# Patient Record
Sex: MALE | Race: ASIAN | NOT HISPANIC OR LATINO | Employment: FULL TIME | URBAN - METROPOLITAN AREA
[De-identification: names, ages, dates, MRNs, and addresses within clinical notes are randomized per-mention and may not be internally consistent; named-entity substitution may affect disease eponyms.]

---

## 2018-05-13 ENCOUNTER — OFFICE VISIT (OUTPATIENT)
Dept: URGENT CARE | Facility: CLINIC | Age: 63
End: 2018-05-13
Payer: COMMERCIAL

## 2018-05-13 VITALS
DIASTOLIC BLOOD PRESSURE: 80 MMHG | HEIGHT: 70 IN | OXYGEN SATURATION: 98 % | BODY MASS INDEX: 28.88 KG/M2 | WEIGHT: 201.72 LBS | SYSTOLIC BLOOD PRESSURE: 124 MMHG | TEMPERATURE: 97.2 F | RESPIRATION RATE: 16 BRPM | HEART RATE: 70 BPM

## 2018-05-13 DIAGNOSIS — R05.9 COUGH: ICD-10-CM

## 2018-05-13 DIAGNOSIS — K13.0 RASH ON LIPS: ICD-10-CM

## 2018-05-13 PROCEDURE — 99214 OFFICE O/P EST MOD 30 MIN: CPT | Performed by: PHYSICIAN ASSISTANT

## 2018-05-13 RX ORDER — METHYLPREDNISOLONE 4 MG/1
TABLET ORAL
Qty: 1 TAB | Refills: 0 | Status: SHIPPED | OUTPATIENT
Start: 2018-05-13 | End: 2021-07-14

## 2018-05-13 RX ORDER — PROMETHAZINE HYDROCHLORIDE AND CODEINE PHOSPHATE 6.25; 1 MG/5ML; MG/5ML
5-10 SYRUP ORAL 3 TIMES DAILY PRN
Qty: 150 ML | Refills: 0 | Status: SHIPPED | OUTPATIENT
Start: 2018-05-13 | End: 2018-05-18

## 2018-05-13 RX ORDER — CEFUROXIME AXETIL 500 MG/1
500 TABLET ORAL 2 TIMES DAILY
Qty: 14 TAB | Refills: 0 | Status: SHIPPED | OUTPATIENT
Start: 2018-05-13 | End: 2018-05-20

## 2018-05-13 ASSESSMENT — ENCOUNTER SYMPTOMS
MUSCULOSKELETAL NEGATIVE: 1
RESPIRATORY NEGATIVE: 1
CONSTITUTIONAL NEGATIVE: 1
EYES NEGATIVE: 1

## 2018-05-13 NOTE — PROGRESS NOTES
"Subjective:      Jonny Spring is a 63 y.o. male who presents with Rash (around mouth)            Rash   This is a new problem. The current episode started in the past 7 days (around mouth). The problem is unchanged. The affected locations include the face and lips. The rash is characterized by redness. It is unknown if there was an exposure to a precipitant. Pertinent negatives include no facial edema. Past treatments include nothing. The treatment provided no relief. There is no history of allergies.       Review of Systems   Constitutional: Negative.    HENT: Negative.    Eyes: Negative.    Respiratory: Negative.    Musculoskeletal: Negative.    Skin: Positive for rash.          Objective:     /80   Pulse 70   Temp 36.2 °C (97.2 °F)   Resp 16   Ht 1.778 m (5' 10\")   Wt 91.5 kg (201 lb 11.5 oz)   SpO2 98%   BMI 28.94 kg/m²      Physical Exam   Constitutional: He is oriented to person, place, and time. He appears well-developed and well-nourished. No distress.   HENT:   Head: Normocephalic and atraumatic.   Mouth/Throat: Oropharynx is clear and moist.   Eyes: Conjunctivae and EOM are normal. Pupils are equal, round, and reactive to light.   Neck: Normal range of motion. Neck supple.   Lymphadenopathy:     He has no cervical adenopathy.   Neurological: He is alert and oriented to person, place, and time.   Skin: Skin is warm and dry.   Psychiatric: He has a normal mood and affect. His behavior is normal.   Nursing note and vitals reviewed.    Active Ambulatory Problems     Diagnosis Date Noted   • Dental caries 10/16/2012     Resolved Ambulatory Problems     Diagnosis Date Noted   • No Resolved Ambulatory Problems     No Additional Past Medical History     Current Outpatient Prescriptions on File Prior to Visit   Medication Sig Dispense Refill   • fluticasone (FLONASE) 50 MCG/ACT nasal spray Spray 2 Sprays in nose every day. Each Nostril 1 Bottle 6   • azithromycin (ZITHROMAX) 250 MG TABS Take  by " mouth every day. 2 tabs by mouth day 1, 1 tab by mouth days 2-5 6 Each 0   • albuterol (VENTOLIN OR PROVENTIL) 108 (90 BASE) MCG/ACT AERS Inhale 2 Puffs by mouth every 6 hours as needed for Shortness of Breath. 1 Inhaler 1   • Pseudoephedrine-APAP-DM (DAYQUIL PO) Take  by mouth.       No current facility-administered medications on file prior to visit.      Social History     Social History   • Marital status:      Spouse name: N/A   • Number of children: N/A   • Years of education: N/A     Occupational History   • Not on file.     Social History Main Topics   • Smoking status: Never Smoker   • Smokeless tobacco: Never Used   • Alcohol use Yes      Comment: occasional   • Drug use: No   • Sexual activity: Not on file     Other Topics Concern   • Not on file     Social History Narrative   • No narrative on file     .History reviewed. No pertinent family history./  Social History     Social History   • Marital status:      Spouse name: N/A   • Number of children: N/A   • Years of education: N/A     Occupational History   • Not on file.     Social History Main Topics   • Smoking status: Never Smoker   • Smokeless tobacco: Never Used   • Alcohol use Yes      Comment: occasional   • Drug use: No   • Sexual activity: Not on file     Other Topics Concern   • Not on file     Social History Narrative   • No narrative on file     Patient has no known allergies.              Assessment/Plan:     · Lip.mouth rash      · rx meds;

## 2019-07-23 ENCOUNTER — APPOINTMENT (OUTPATIENT)
Dept: MEDICAL GROUP | Facility: MEDICAL CENTER | Age: 64
End: 2019-07-23
Payer: COMMERCIAL

## 2021-07-14 ENCOUNTER — OFFICE VISIT (OUTPATIENT)
Dept: URGENT CARE | Facility: CLINIC | Age: 66
End: 2021-07-14
Payer: COMMERCIAL

## 2021-07-14 VITALS
BODY MASS INDEX: 29.33 KG/M2 | RESPIRATION RATE: 16 BRPM | DIASTOLIC BLOOD PRESSURE: 80 MMHG | HEART RATE: 62 BPM | SYSTOLIC BLOOD PRESSURE: 110 MMHG | HEIGHT: 69 IN | WEIGHT: 198 LBS | OXYGEN SATURATION: 100 % | TEMPERATURE: 97 F

## 2021-07-14 DIAGNOSIS — I87.8 VENOUS STASIS: ICD-10-CM

## 2021-07-14 DIAGNOSIS — E11.319 DIABETIC RETINOPATHY OF LEFT EYE ASSOCIATED WITH TYPE 2 DIABETES MELLITUS, MACULAR EDEMA PRESENCE UNSPECIFIED, UNSPECIFIED RETINOPATHY SEVERITY (HCC): ICD-10-CM

## 2021-07-14 PROCEDURE — 99203 OFFICE O/P NEW LOW 30 MIN: CPT | Performed by: NURSE PRACTITIONER

## 2021-07-14 ASSESSMENT — ENCOUNTER SYMPTOMS
COUGH: 0
SHORTNESS OF BREATH: 0
PALPITATIONS: 0
PHOTOPHOBIA: 0
PND: 0
BLURRED VISION: 1
NAUSEA: 0
DIZZINESS: 0
VOMITING: 0
WHEEZING: 0
ORTHOPNEA: 0
FEVER: 0
CLAUDICATION: 0
CHILLS: 0
EYE PAIN: 0
DOUBLE VISION: 0
EYE DISCHARGE: 0
HEADACHES: 0
EYE REDNESS: 0

## 2021-07-14 NOTE — PROGRESS NOTES
Subjective:   Jonny Spring is a 66 y.o. male who presents for Blurred Vision (left eye, x3-4wks seen by eye doctor and referred to speacialist, not able to see specialist) and Leg Swelling (not swollen but having black spots on left leg, x1yr. ago)      HPI  66-year-old male patient in urgent care for blurred vision in his left eye for the past 3 to 4 weeks.  Was seen by [facility] optometrist who diagnosed him with diabetic retinopathy.  Provided patient with information for retinal specialist and patient has tried calling and even gone to the office and they state that he is unable to be seen for the next month.  Denies history of diabetes however patient records indicate that he was being worked up in 2012 for diabetes type 2.  He has never taken any medication for it.  Also is noticed some discoloration of his bilateral lower extremities.  Denies shortness of breath, wheezing, difficulty breathing, chest pain, radiating pain, numbness or tingling. Denies any leg swelling consistently.    Review of Systems   Constitutional: Negative for chills, fever and malaise/fatigue.   Eyes: Positive for blurred vision. Negative for double vision, photophobia, pain, discharge and redness.   Respiratory: Negative for cough, shortness of breath and wheezing.    Cardiovascular: Negative for chest pain, palpitations, orthopnea, claudication, leg swelling and PND.   Gastrointestinal: Negative for nausea and vomiting.   Neurological: Negative for dizziness and headaches.   All other systems reviewed and are negative.      Patient Active Problem List   Diagnosis   • Dental caries     History reviewed. No pertinent surgical history.  Social History     Tobacco Use   • Smoking status: Never Smoker   • Smokeless tobacco: Never Used   Substance Use Topics   • Alcohol use: Yes     Comment: occasional   • Drug use: No      History reviewed. No pertinent family history.   (Allergies, Medications, & Tobacco/Substance Use were reconciled by  "the Medical Assistant and reviewed by myself. The family history is prepopulated)     Objective:     /80 (BP Location: Left arm, Patient Position: Sitting, BP Cuff Size: Adult)   Pulse 62   Temp 36.1 °C (97 °F) (Temporal)   Resp 16   Ht 1.753 m (5' 9\")   Wt 89.8 kg (198 lb)   SpO2 100%   BMI 29.24 kg/m²     Physical Exam  Vitals reviewed.   Constitutional:       Appearance: Normal appearance.   Eyes:      Extraocular Movements: Extraocular movements intact.      Conjunctiva/sclera: Conjunctivae normal.      Funduscopic exam:        Left eye: Hemorrhage present.   Cardiovascular:      Rate and Rhythm: Normal rate and regular rhythm.      Pulses: Normal pulses.           Dorsalis pedis pulses are 2+ on the right side and 2+ on the left side.        Posterior tibial pulses are 2+ on the right side and 2+ on the left side.      Heart sounds: Normal heart sounds, S1 normal and S2 normal.      Comments: Blue/black discoloration of BLE  Pulmonary:      Effort: Pulmonary effort is normal.      Breath sounds: Normal breath sounds.   Abdominal:      General: Bowel sounds are normal. There is no distension.      Palpations: Abdomen is soft.      Tenderness: There is no abdominal tenderness.   Musculoskeletal:      Right lower leg: No edema.      Left lower leg: No edema.   Skin:     General: Skin is warm.   Neurological:      Mental Status: He is alert and oriented to person, place, and time.   Psychiatric:         Mood and Affect: Mood normal.         Behavior: Behavior normal.         Thought Content: Thought content normal.         Judgment: Judgment normal.         Assessment/Plan:     1. Diabetic retinopathy of left eye associated with type 2 diabetes mellitus, macular edema presence unspecified, unspecified retinopathy severity (HCC)  AMB REFERRAL TO ESTABLISH WITH [facility]   2. Venous stasis       Discussed physical examination findings as well as patient presentation, length patient symptoms is " consistent with diabetic retinopathy more than likely related to type 2 diabetes melitis.  Patient also appears to have some venous stasis of both lower extremities.  Advised patient to wear AMADOU hose and will provide patient with a referral to primary care for further work-up and evaluation.  Advised patient to continue to try to make an appointment with the retinal specialist for further work-up and evaluation of vision loss.    Patient expressed understanding agrees to plan is no further questions at this time.    Reached out to practice manager for appointment for patient. They will be in contact with him to schedule something soon.   LM for patient regarding this     Differential diagnosis, natural history, supportive care, and indications for immediate follow-up discussed.    Advised the patient to follow-up with the primary care physician for recheck, reevaluation, and consideration of further management.    Please note that this dictation was created using voice recognition software. I have made a reasonable attempt to correct obvious errors, but I expect that there are errors of grammar and possibly content that I did not discover before finalizing the note.    This note was electronically signed HARJIT Wolfe

## 2021-07-16 ENCOUNTER — TELEPHONE (OUTPATIENT)
Dept: SCHEDULING | Facility: IMAGING CENTER | Age: 66
End: 2021-07-16

## 2021-07-19 ENCOUNTER — OFFICE VISIT (OUTPATIENT)
Dept: MEDICAL GROUP | Facility: LAB | Age: 66
End: 2021-07-19
Payer: COMMERCIAL

## 2021-07-19 VITALS
SYSTOLIC BLOOD PRESSURE: 134 MMHG | WEIGHT: 199.2 LBS | OXYGEN SATURATION: 100 % | TEMPERATURE: 97.3 F | BODY MASS INDEX: 29.51 KG/M2 | RESPIRATION RATE: 16 BRPM | HEIGHT: 69 IN | DIASTOLIC BLOOD PRESSURE: 92 MMHG | HEART RATE: 67 BPM

## 2021-07-19 DIAGNOSIS — E53.8 VITAMIN B12 DEFICIENCY: ICD-10-CM

## 2021-07-19 DIAGNOSIS — Z00.00 PREVENTATIVE HEALTH CARE: ICD-10-CM

## 2021-07-19 DIAGNOSIS — I87.2 VENOUS STASIS DERMATITIS OF LEFT LOWER EXTREMITY: ICD-10-CM

## 2021-07-19 DIAGNOSIS — E55.9 VITAMIN D DEFICIENCY: ICD-10-CM

## 2021-07-19 DIAGNOSIS — E11.319 TYPE 2 DIABETES MELLITUS WITH RETINOPATHY OF LEFT EYE, WITHOUT LONG-TERM CURRENT USE OF INSULIN, MACULAR EDEMA PRESENCE UNSPECIFIED, UNSPECIFIED RETINOPATHY SEVERITY (HCC): Primary | ICD-10-CM

## 2021-07-19 DIAGNOSIS — Z23 NEED FOR VACCINATION: ICD-10-CM

## 2021-07-19 PROBLEM — E11.9 TYPE 2 DIABETES MELLITUS (HCC): Status: ACTIVE | Noted: 2021-07-19

## 2021-07-19 LAB
GLUCOSE BLD-MCNC: 254 MG/DL (ref 70–100)
HBA1C MFR BLD: 9.6 % (ref 0–5.6)
INT CON NEG: NEGATIVE
INT CON POS: POSITIVE

## 2021-07-19 PROCEDURE — 82962 GLUCOSE BLOOD TEST: CPT | Performed by: PHYSICIAN ASSISTANT

## 2021-07-19 PROCEDURE — 90732 PPSV23 VACC 2 YRS+ SUBQ/IM: CPT | Performed by: PHYSICIAN ASSISTANT

## 2021-07-19 PROCEDURE — 90471 IMMUNIZATION ADMIN: CPT | Performed by: PHYSICIAN ASSISTANT

## 2021-07-19 PROCEDURE — 83036 HEMOGLOBIN GLYCOSYLATED A1C: CPT | Performed by: PHYSICIAN ASSISTANT

## 2021-07-19 PROCEDURE — 99214 OFFICE O/P EST MOD 30 MIN: CPT | Mod: 25 | Performed by: PHYSICIAN ASSISTANT

## 2021-07-19 ASSESSMENT — VISUAL ACUITY
OD_CC: 20/25
OS_CC: 0

## 2021-07-19 ASSESSMENT — PATIENT HEALTH QUESTIONNAIRE - PHQ9: CLINICAL INTERPRETATION OF PHQ2 SCORE: 0

## 2021-07-19 NOTE — PROGRESS NOTES
"Subjective:   Jonny Spring is a 66 y.o. male here today to establish care with new provider, diabetic management    Type 2 diabetes mellitus (HCC)  New to me, chronic condition  Appears that he was initially diagnosed in 2012  Reports that he has never been on medication for diabetes, was able to control his levels with diet modifications and exercise  However, he has been noticing almost complete vision loss in the left eye for the past month as well as darkened skin changes on the left leg.  He was recently seen by [facility] optometry and was referred to a diabetic retinal specialist for suspected diabetic retinopathy of the left eye.  Reports that recently his diet has been fairly poor though he is able to make changes pretty easily.    Venous stasis dermatitis of left lower extremity  New to me, chronic  Noticing darkening skin changes of the left leg for the past couple months  Does have varicose veins that are not painful per patient  States that he sits almost all day at work  Does not currently wear compression stockings       Current medicines (including changes today)  Current Outpatient Medications   Medication Sig Dispense Refill   • metFORMIN (GLUCOPHAGE) 500 MG Tab Take 1 tablet by mouth 2 times a day with meals for 14 days. 28 tablet 0   • metformin (GLUCOPHAGE) 1000 MG tablet Take 1 tablet by mouth 2 times a day with meals. START AFTER 2 WEEKS OF 500mg TABS 180 tablet 1     No current facility-administered medications for this visit.     He  has no past medical history on file.    ROS   No fevers chills  No weight changes  Darkening of the skin of the left leg  Vision changes of the left eye  No chest pain, no shortness of breath, no abdominal pain       Objective:     /92 (BP Location: Left arm, Patient Position: Sitting, BP Cuff Size: Adult)   Pulse 67   Temp 36.3 °C (97.3 °F) (Temporal)   Resp 16   Ht 1.753 m (5' 9\")   Wt 90.4 kg (199 lb 3.2 oz)   SpO2 100%  Body mass index is 29.42 " kg/m².   Physical Exam:  Constitutional: Alert, no distress.  Skin: Warm, dry, good turgor, large varicose veins noted on bilateral lower extremity.  Dark hyperpigmentation noted on the left lateral lower leg.  Eye: Equal, round and reactive, conjunctiva clear, lids normal.  Neck: Trachea midline, no masses, no thyromegaly. No cervical or supraclavicular lymphadenopathy  Respiratory: Unlabored respiratory effort, lungs clear to auscultation, no wheezes, no ronchi.  Cardiovascular: Normal S1, S2, no murmur, no edema.  Psych: Alert and oriented x3, normal affect and mood.        Assessment and Plan:   The following treatment plan was discussed    1. Type 2 diabetes mellitus with retinopathy of left eye, without long-term current use of insulin, macular edema presence unspecified, unspecified retinopathy severity (HCC)  New to me, chronic condition  A1c is 9.6 today in clinic  Regarding vision changes -we have contacted the retinal specialist and will send over these notes; the retinal specialist will contact the patient to evaluate him ASAP.  Patient agreeable to starting Metformin though declined any further medication particularly with GLP-1.  We will start Metformin 500 mg 3 times daily x2 weeks and then increase to 1000 mg.  We advise to reduce sugar/carbohydrate/alcohol, eat more vegetables and lean meats such as fish/chicken/turkey. We also recommend 30 minutes of cardiovascular exercise most days of the week as tolerated.  We also discussed the importance of statin therapy especially with significantly elevated blood sugars today, however patient declines starting a statin.  Follow-up in 3 months for repeat blood work  - metFORMIN (GLUCOPHAGE) 500 MG Tab; Take 1 tablet by mouth 2 times a day with meals for 14 days.  Dispense: 28 tablet; Refill: 0  - metformin (GLUCOPHAGE) 1000 MG tablet; Take 1 tablet by mouth 2 times a day with meals. START AFTER 2 WEEKS OF 500mg TABS  Dispense: 180 tablet; Refill: 1  - POCT  Hemoglobin A1C  - POCT Glucose - 254 today in clinic  - MICROALBUMIN CREAT RATIO URINE; Future    2. Preventative health care  - CBC WITH DIFFERENTIAL; Future  - Comp Metabolic Panel; Future  - Lipid Profile; Future  - HEMOGLOBIN A1C; Future  - TSH WITH REFLEX TO FT4; Future  - HEP C VIRUS ANTIBODY; Future    3. Need for vaccination  - Pneumococal Polysaccharide Vaccine 23-Valent =>3YO SQ/IM    4. Vitamin B12 deficiency  - VITAMIN B12; Future    5. Vitamin D deficiency  - VITAMIN D,25 HYDROXY; Future    6. Venous stasis dermatitis of left lower extremity  New to me, chronic condition; asymptomatic venous stasis of left lower extremity  Recommend wearing compression stockings especially if he sits for prolonged periods of time at work.  Continue to monitor symptoms.  We will recheck this in 3 months at follow-up    *Patient declined colonoscopy and Shingrx today     New to me; has been seen by [facility] w/in the past 3 years.   Followup: Return in about 3 months (around 10/19/2021).       Luz Gong P.A.-C.  Supervising MD: Dr. Zafar Mendes MD  07/19/21

## 2021-07-19 NOTE — LETTER
Date: 21    Recipient:  [facility]      Recipient Fax Number: [phone]          Message: Medical Records for UMER Spring  [birth year]    Please call to schedule patient. Thanks!                    Confidentiality / Privacy Note: If you are not the intended recipient of this document, this document should be returned to [facility] immediately. Please contact the sender at [facility] so that we can arrange for the return of this transmission to us at no cost to you. The document accompanying this transmission contains information from [facility], which is confidential, proprietary or copyrighted and is intended solely for the use of the individual or entity named on this transaction. If you are not the intended recipient, you are notified that disclosing, copying, distributing or taking any action in reliance on the contents of this information is strictly prohibited. This prohibition includes, without limitations, displaying this transmission or any portion thereof, on any public bulletin board.            [address] [place] [id number] - [phone] - [facility]

## 2021-07-19 NOTE — ASSESSMENT & PLAN NOTE
New to me, chronic condition  Appears that he was initially diagnosed in 2012  Reports that he has never been on medication for diabetes, was able to control his levels with diet modifications and exercise  However, he has been noticing almost complete vision loss in the left eye for the past month as well as darkened skin changes on the left leg.  He was recently seen by [facility] optometry and was referred to a diabetic retinal specialist for suspected diabetic retinopathy of the left eye.  Reports that recently his diet has been fairly poor though he is able to make changes pretty easily.

## 2021-07-19 NOTE — LETTER
[facility]  Luz Gong P.A.-C.  [address]  [place] [address]  Fax: [phone]   Authorization for Release/Disclosure of   Protected Health Information   Name: NEFTALY PORTER : 1955 SSN: [id number]   Address: [address] [place] [address] [place] [address] Phone:    [phone] (home)    I authorize the entity listed below to release/disclose the PHI below to:   [facility]/Luz Gong P.A.-C. and Luz Gong P.A.-C.   Provider or Entity Name:  [facility]   Address   City, State, Zip   Phone:      Fax:     Reason for request: continuity of care   Information to be released:    [  ] LAST COLONOSCOPY,  including any PATH REPORT and follow-up  [  ] LAST FIT/COLOGUARD RESULT [  ] LAST DEXA  [  ] LAST MAMMOGRAM  [  ] LAST PAP  [  ] LAST LABS [  ] RETINA EXAM REPORT  [  ] IMMUNIZATION RECORDS  [ xxx  ] Release all info      [  ] Check here and initial the line next to each item to release ALL health information INCLUDING  _____ Care and treatment for drug and / or alcohol abuse  _____ HIV testing, infection status, or AIDS  _____ Genetic Testing    DATES OF SERVICE OR TIME PERIOD TO BE DISCLOSED: _____________  I understand and acknowledge that:  * This Authorization may be revoked at any time by you in writing, except if your health information has already been used or disclosed.  * Your health information that will be used or disclosed as a result of you signing this authorization could be re-disclosed by the recipient. If this occurs, your re-disclosed health information may no longer be protected by State or Federal laws.  * You may refuse to sign this Authorization. Your refusal will not affect your ability to obtain treatment.  * This Authorization becomes effective upon signing and will  on (date) __________.      If no date is indicated, this Authorization will  one (1) year from the signature date.    Name: Neftaly  Saw    Signature:   Date:     7/19/2021       PLEASE FAX REQUESTED RECORDS BACK TO: [phone]

## 2021-07-20 ENCOUNTER — HOSPITAL ENCOUNTER (OUTPATIENT)
Dept: LAB | Facility: MEDICAL CENTER | Age: 66
End: 2021-07-20
Attending: PHYSICIAN ASSISTANT
Payer: COMMERCIAL

## 2021-07-20 DIAGNOSIS — Z00.00 PREVENTATIVE HEALTH CARE: ICD-10-CM

## 2021-07-20 DIAGNOSIS — E53.8 VITAMIN B12 DEFICIENCY: ICD-10-CM

## 2021-07-20 DIAGNOSIS — E11.319 TYPE 2 DIABETES MELLITUS WITH RETINOPATHY OF LEFT EYE, WITHOUT LONG-TERM CURRENT USE OF INSULIN, MACULAR EDEMA PRESENCE UNSPECIFIED, UNSPECIFIED RETINOPATHY SEVERITY (HCC): ICD-10-CM

## 2021-07-20 DIAGNOSIS — E55.9 VITAMIN D DEFICIENCY: ICD-10-CM

## 2021-07-20 LAB
BASOPHILS # BLD AUTO: 0.8 % (ref 0–1.8)
BASOPHILS # BLD: 0.04 K/UL (ref 0–0.12)
EOSINOPHIL # BLD AUTO: 0.24 K/UL (ref 0–0.51)
EOSINOPHIL NFR BLD: 4.6 % (ref 0–6.9)
ERYTHROCYTE [DISTWIDTH] IN BLOOD BY AUTOMATED COUNT: 37 FL (ref 35.9–50)
HCT VFR BLD AUTO: 47.5 % (ref 42–52)
HGB BLD-MCNC: 16.2 G/DL (ref 14–18)
IMM GRANULOCYTES # BLD AUTO: 0.01 K/UL (ref 0–0.11)
IMM GRANULOCYTES NFR BLD AUTO: 0.2 % (ref 0–0.9)
LYMPHOCYTES # BLD AUTO: 2.4 K/UL (ref 1–4.8)
LYMPHOCYTES NFR BLD: 45.5 % (ref 22–41)
MCH RBC QN AUTO: 30.8 PG (ref 27–33)
MCHC RBC AUTO-ENTMCNC: 34.1 G/DL (ref 33.7–35.3)
MCV RBC AUTO: 90.3 FL (ref 81.4–97.8)
MONOCYTES # BLD AUTO: 0.42 K/UL (ref 0–0.85)
MONOCYTES NFR BLD AUTO: 8 % (ref 0–13.4)
NEUTROPHILS # BLD AUTO: 2.16 K/UL (ref 1.82–7.42)
NEUTROPHILS NFR BLD: 40.9 % (ref 44–72)
NRBC # BLD AUTO: 0 K/UL
NRBC BLD-RTO: 0 /100 WBC
PLATELET # BLD AUTO: 250 K/UL (ref 164–446)
PMV BLD AUTO: 10.4 FL (ref 9–12.9)
RBC # BLD AUTO: 5.26 M/UL (ref 4.7–6.1)
WBC # BLD AUTO: 5.3 K/UL (ref 4.8–10.8)

## 2021-07-20 PROCEDURE — 84443 ASSAY THYROID STIM HORMONE: CPT

## 2021-07-20 PROCEDURE — 82570 ASSAY OF URINE CREATININE: CPT

## 2021-07-20 PROCEDURE — 82043 UR ALBUMIN QUANTITATIVE: CPT

## 2021-07-20 PROCEDURE — 80053 COMPREHEN METABOLIC PANEL: CPT

## 2021-07-20 PROCEDURE — 86803 HEPATITIS C AB TEST: CPT

## 2021-07-20 PROCEDURE — 85025 COMPLETE CBC W/AUTO DIFF WBC: CPT

## 2021-07-20 PROCEDURE — 80061 LIPID PANEL: CPT

## 2021-07-20 PROCEDURE — 36415 COLL VENOUS BLD VENIPUNCTURE: CPT

## 2021-07-20 PROCEDURE — 82607 VITAMIN B-12: CPT

## 2021-07-20 PROCEDURE — 82306 VITAMIN D 25 HYDROXY: CPT

## 2021-07-21 LAB
25(OH)D3 SERPL-MCNC: 48 NG/ML (ref 30–100)
ALBUMIN SERPL BCP-MCNC: 4.2 G/DL (ref 3.2–4.9)
ALBUMIN/GLOB SERPL: 1.9 G/DL
ALP SERPL-CCNC: 57 U/L (ref 30–99)
ALT SERPL-CCNC: 17 U/L (ref 2–50)
ANION GAP SERPL CALC-SCNC: 11 MMOL/L (ref 7–16)
AST SERPL-CCNC: 23 U/L (ref 12–45)
BILIRUB SERPL-MCNC: 0.7 MG/DL (ref 0.1–1.5)
BUN SERPL-MCNC: 8 MG/DL (ref 8–22)
CALCIUM SERPL-MCNC: 8.8 MG/DL (ref 8.5–10.5)
CHLORIDE SERPL-SCNC: 101 MMOL/L (ref 96–112)
CHOLEST SERPL-MCNC: 196 MG/DL (ref 100–199)
CO2 SERPL-SCNC: 25 MMOL/L (ref 20–33)
CREAT SERPL-MCNC: 0.87 MG/DL (ref 0.5–1.4)
CREAT UR-MCNC: 125.69 MG/DL
FASTING STATUS PATIENT QL REPORTED: NORMAL
GLOBULIN SER CALC-MCNC: 2.2 G/DL (ref 1.9–3.5)
GLUCOSE SERPL-MCNC: 180 MG/DL (ref 65–99)
HCV AB SER QL: NORMAL
HDLC SERPL-MCNC: 45 MG/DL
LDLC SERPL CALC-MCNC: 125 MG/DL
MICROALBUMIN UR-MCNC: <1.2 MG/DL
MICROALBUMIN/CREAT UR: NORMAL MG/G (ref 0–30)
POTASSIUM SERPL-SCNC: 4.3 MMOL/L (ref 3.6–5.5)
PROT SERPL-MCNC: 6.4 G/DL (ref 6–8.2)
SODIUM SERPL-SCNC: 137 MMOL/L (ref 135–145)
TRIGL SERPL-MCNC: 129 MG/DL (ref 0–149)
TSH SERPL DL<=0.005 MIU/L-ACNC: 1.97 UIU/ML (ref 0.38–5.33)
VIT B12 SERPL-MCNC: 1545 PG/ML (ref 211–911)

## 2021-10-18 ENCOUNTER — HOSPITAL ENCOUNTER (OUTPATIENT)
Dept: LAB | Facility: MEDICAL CENTER | Age: 66
End: 2021-10-18
Attending: PHYSICIAN ASSISTANT
Payer: COMMERCIAL

## 2021-10-18 ENCOUNTER — OFFICE VISIT (OUTPATIENT)
Dept: MEDICAL GROUP | Facility: LAB | Age: 66
End: 2021-10-18
Payer: COMMERCIAL

## 2021-10-18 VITALS
SYSTOLIC BLOOD PRESSURE: 132 MMHG | HEIGHT: 69 IN | DIASTOLIC BLOOD PRESSURE: 82 MMHG | WEIGHT: 198.7 LBS | OXYGEN SATURATION: 100 % | RESPIRATION RATE: 18 BRPM | HEART RATE: 78 BPM | BODY MASS INDEX: 29.43 KG/M2 | TEMPERATURE: 97 F

## 2021-10-18 DIAGNOSIS — I87.2 VENOUS STASIS DERMATITIS OF LEFT LOWER EXTREMITY: ICD-10-CM

## 2021-10-18 DIAGNOSIS — E11.319 TYPE 2 DIABETES MELLITUS WITH RETINOPATHY OF LEFT EYE, WITHOUT LONG-TERM CURRENT USE OF INSULIN, MACULAR EDEMA PRESENCE UNSPECIFIED, UNSPECIFIED RETINOPATHY SEVERITY (HCC): ICD-10-CM

## 2021-10-18 DIAGNOSIS — Z12.5 SCREENING PSA (PROSTATE SPECIFIC ANTIGEN): ICD-10-CM

## 2021-10-18 LAB
ALBUMIN SERPL BCP-MCNC: 4.4 G/DL (ref 3.2–4.9)
ALBUMIN/GLOB SERPL: 1.9 G/DL
ALP SERPL-CCNC: 39 U/L (ref 30–99)
ALT SERPL-CCNC: 12 U/L (ref 2–50)
ANION GAP SERPL CALC-SCNC: 8 MMOL/L (ref 7–16)
AST SERPL-CCNC: 17 U/L (ref 12–45)
BASOPHILS # BLD AUTO: 1 % (ref 0–1.8)
BASOPHILS # BLD: 0.05 K/UL (ref 0–0.12)
BILIRUB SERPL-MCNC: 0.8 MG/DL (ref 0.1–1.5)
BUN SERPL-MCNC: 9 MG/DL (ref 8–22)
CALCIUM SERPL-MCNC: 9.4 MG/DL (ref 8.5–10.5)
CHLORIDE SERPL-SCNC: 106 MMOL/L (ref 96–112)
CHOLEST SERPL-MCNC: 177 MG/DL (ref 100–199)
CO2 SERPL-SCNC: 27 MMOL/L (ref 20–33)
CREAT SERPL-MCNC: 1.13 MG/DL (ref 0.5–1.4)
EOSINOPHIL # BLD AUTO: 0.16 K/UL (ref 0–0.51)
EOSINOPHIL NFR BLD: 3.1 % (ref 0–6.9)
ERYTHROCYTE [DISTWIDTH] IN BLOOD BY AUTOMATED COUNT: 42.8 FL (ref 35.9–50)
EST. AVERAGE GLUCOSE BLD GHB EST-MCNC: 117 MG/DL
FASTING STATUS PATIENT QL REPORTED: NORMAL
GLOBULIN SER CALC-MCNC: 2.3 G/DL (ref 1.9–3.5)
GLUCOSE SERPL-MCNC: 103 MG/DL (ref 65–99)
HBA1C MFR BLD: 5.7 % (ref 4–5.6)
HCT VFR BLD AUTO: 45.8 % (ref 42–52)
HDLC SERPL-MCNC: 54 MG/DL
HGB BLD-MCNC: 15.5 G/DL (ref 14–18)
IMM GRANULOCYTES # BLD AUTO: 0.01 K/UL (ref 0–0.11)
IMM GRANULOCYTES NFR BLD AUTO: 0.2 % (ref 0–0.9)
LDLC SERPL CALC-MCNC: 108 MG/DL
LYMPHOCYTES # BLD AUTO: 2.1 K/UL (ref 1–4.8)
LYMPHOCYTES NFR BLD: 40.3 % (ref 22–41)
MCH RBC QN AUTO: 31.2 PG (ref 27–33)
MCHC RBC AUTO-ENTMCNC: 33.8 G/DL (ref 33.7–35.3)
MCV RBC AUTO: 92.2 FL (ref 81.4–97.8)
MONOCYTES # BLD AUTO: 0.45 K/UL (ref 0–0.85)
MONOCYTES NFR BLD AUTO: 8.6 % (ref 0–13.4)
NEUTROPHILS # BLD AUTO: 2.44 K/UL (ref 1.82–7.42)
NEUTROPHILS NFR BLD: 46.8 % (ref 44–72)
NRBC # BLD AUTO: 0 K/UL
NRBC BLD-RTO: 0 /100 WBC
PLATELET # BLD AUTO: 252 K/UL (ref 164–446)
PMV BLD AUTO: 9.6 FL (ref 9–12.9)
POTASSIUM SERPL-SCNC: 4.2 MMOL/L (ref 3.6–5.5)
PROT SERPL-MCNC: 6.7 G/DL (ref 6–8.2)
PSA SERPL-MCNC: 3 NG/ML (ref 0–4)
RBC # BLD AUTO: 4.97 M/UL (ref 4.7–6.1)
SODIUM SERPL-SCNC: 141 MMOL/L (ref 135–145)
TRIGL SERPL-MCNC: 73 MG/DL (ref 0–149)
WBC # BLD AUTO: 5.2 K/UL (ref 4.8–10.8)

## 2021-10-18 PROCEDURE — 99214 OFFICE O/P EST MOD 30 MIN: CPT | Performed by: PHYSICIAN ASSISTANT

## 2021-10-18 PROCEDURE — 84153 ASSAY OF PSA TOTAL: CPT

## 2021-10-18 PROCEDURE — 83036 HEMOGLOBIN GLYCOSYLATED A1C: CPT

## 2021-10-18 PROCEDURE — 80053 COMPREHEN METABOLIC PANEL: CPT

## 2021-10-18 PROCEDURE — 80061 LIPID PANEL: CPT

## 2021-10-18 PROCEDURE — 36415 COLL VENOUS BLD VENIPUNCTURE: CPT

## 2021-10-18 PROCEDURE — 85025 COMPLETE CBC W/AUTO DIFF WBC: CPT

## 2021-10-18 ASSESSMENT — FIBROSIS 4 INDEX: FIB4 SCORE: 1.45

## 2021-10-18 NOTE — ASSESSMENT & PLAN NOTE
Follow up  Doing well on Metformin 1000mg BID.   Blood sugar is averaging around 80-90s; never above 130.   Weight   Lab Results   Component Value Date/Time    HBA1C 9.6 (A) 07/19/2021 08:39 AM   Patient has been working on diet and increasing exercise as tolerated.  He reports that he goes to the gym almost every day.  Able to walk approximately 5 miles uninterrupted.    Patient is due to see retinal specialist on 10/22.  He continues to have about 80% vision loss in the left eye.  No eye pain

## 2021-10-18 NOTE — ASSESSMENT & PLAN NOTE
Follow-up, ongoing symptoms.  Condition is relatively stable  He does have large varicose veins on his left lower leg, does not wear compression stockings often  Varicose veins are not painful

## 2021-10-18 NOTE — PROGRESS NOTES
"Subjective:   Jonny Spring is a 66 y.o. male here today for diabetes follow-up    Type 2 diabetes mellitus (HCC)  Follow up  Doing well on Metformin 1000mg BID.   Blood sugar is averaging around 80-90s; never above 130.   Weight   Lab Results   Component Value Date/Time    HBA1C 9.6 (A) 07/19/2021 08:39 AM   Patient has been working on diet and increasing exercise as tolerated.  He reports that he goes to the gym almost every day.  Able to walk approximately 5 miles uninterrupted.    Patient is due to see retinal specialist on 10/22.  He continues to have about 80% vision loss in the left eye.  No eye pain      Venous stasis dermatitis of left lower extremity  Follow-up, ongoing symptoms.  Condition is relatively stable  He does have large varicose veins on his left lower leg, does not wear compression stockings often  Varicose veins are not painful     Current medicines (including changes today)  Current Outpatient Medications   Medication Sig Dispense Refill   • metformin (GLUCOPHAGE) 1000 MG tablet Take 1 tablet by mouth 2 times a day with meals. START AFTER 2 WEEKS OF 500mg TABS 180 tablet 1     No current facility-administered medications for this visit.     He  has no past medical history on file.    ROS   No chest pain, no shortness of breath, no abdominal pain       Objective:     /82 (BP Location: Left arm, Patient Position: Sitting, BP Cuff Size: Adult)   Pulse 78   Temp 36.1 °C (97 °F) (Temporal)   Resp 18   Ht 1.753 m (5' 9\")   Wt 90.1 kg (198 lb 11.2 oz)   SpO2 100%  Body mass index is 29.34 kg/m².  Physical Exam:  Constitutional: Alert, no distress.  Skin: Warm, dry, good turgor, no rashes in visible areas.  Eye: Equal, round and reactive, conjunctiva clear, lids normal.  Neck: Trachea midline, no masses, no thyromegaly. No cervical or supraclavicular lymphadenopathy  Respiratory: Unlabored respiratory effort, lungs clear to auscultation, no wheezes, no ronchi.  Cardiovascular: Normal " S1, S2, no murmur, no edema.  Large varicose vein on the anterior lower leg.  Stable chronic venous stasis noted on the anterior and lateral left lower leg.  Psych: Alert and oriented x3, normal affect and mood.        Assessment and Plan:   The following treatment plan was discussed    1. Type 2 diabetes mellitus with retinopathy of left eye, without long-term current use of insulin, macular edema presence unspecified, unspecified retinopathy severity (HCC)  Follow-up, patient is due for updated labs  Reports that his fasting readings range from 80s to 90s, never above 130  He continues to use Metformin 1000 mg p.o. twice daily  He will follow up with the retinal specialist this coming Friday.  Pending lab results will follow up with him in either 3 or 6 months, of course sooner if needed  - CBC WITH DIFFERENTIAL; Future  - Comp Metabolic Panel; Future  - Lipid Profile; Future  - HEMOGLOBIN A1C; Future    2. Screening PSA (prostate specific antigen)  - PROSTATE SPECIFIC AG DIAGNOSTIC; Future    3. Venous stasis dermatitis of left lower extremity  Chronic condition, I have placed a referral to vascular surgery the patient does not seem interested in this referral at this time.  Continue to monitor  - REFERRAL TO VASCULAR SURGERY      Followup: Return for 3-6months pending lab results .       Luz Gong P.A.-C.  Supervising MD: Dr. Zafar Mendes MD  10/18/21

## 2024-05-30 ENCOUNTER — HOSPITAL ENCOUNTER (OUTPATIENT)
Facility: MEDICAL CENTER | Age: 69
End: 2024-05-30
Attending: STUDENT IN AN ORGANIZED HEALTH CARE EDUCATION/TRAINING PROGRAM
Payer: COMMERCIAL

## 2024-05-30 ENCOUNTER — OFFICE VISIT (OUTPATIENT)
Dept: URGENT CARE | Facility: PHYSICIAN GROUP | Age: 69
End: 2024-05-30
Payer: COMMERCIAL

## 2024-05-30 VITALS
BODY MASS INDEX: 29.62 KG/M2 | HEIGHT: 69 IN | SYSTOLIC BLOOD PRESSURE: 120 MMHG | WEIGHT: 200 LBS | TEMPERATURE: 97.5 F | RESPIRATION RATE: 16 BRPM | OXYGEN SATURATION: 99 % | DIASTOLIC BLOOD PRESSURE: 62 MMHG | HEART RATE: 75 BPM

## 2024-05-30 DIAGNOSIS — K59.00 CONSTIPATION, UNSPECIFIED CONSTIPATION TYPE: ICD-10-CM

## 2024-05-30 DIAGNOSIS — N30.01 ACUTE CYSTITIS WITH HEMATURIA: ICD-10-CM

## 2024-05-30 LAB
APPEARANCE UR: NORMAL
BILIRUB UR STRIP-MCNC: NEGATIVE MG/DL
COLOR UR AUTO: NORMAL
FORWARD REASON: SPWHY: NORMAL
FORWARDED TO LAB: SPWHR: NORMAL
GLUCOSE UR STRIP.AUTO-MCNC: 100 MG/DL
KETONES UR STRIP.AUTO-MCNC: NEGATIVE MG/DL
LEUKOCYTE ESTERASE UR QL STRIP.AUTO: NORMAL
NITRITE UR QL STRIP.AUTO: POSITIVE
PH UR STRIP.AUTO: 5 [PH] (ref 5–8)
PROT UR QL STRIP: NEGATIVE MG/DL
RBC UR QL AUTO: NORMAL
SP GR UR STRIP.AUTO: 1.01
SPECIMEN SENT (2ND): SPWT2: NORMAL
SPECIMEN SENT: SPWT1: NORMAL
UROBILINOGEN UR STRIP-MCNC: 0.2 MG/DL

## 2024-05-30 RX ORDER — GLIMEPIRIDE 4 MG/1
TABLET ORAL
COMMUNITY
Start: 2024-05-28

## 2024-05-30 RX ORDER — PHENAZOPYRIDINE HYDROCHLORIDE 200 MG/1
TABLET, FILM COATED ORAL
COMMUNITY
Start: 2024-05-28

## 2024-05-30 RX ORDER — BLOOD-GLUCOSE METER
KIT MISCELLANEOUS
COMMUNITY
Start: 2024-05-28

## 2024-05-30 RX ORDER — NITROFURANTOIN 25; 75 MG/1; MG/1
CAPSULE ORAL
COMMUNITY
Start: 2024-05-28

## 2024-05-30 RX ORDER — CEPHALEXIN 500 MG/1
500 CAPSULE ORAL 2 TIMES DAILY
Qty: 14 CAPSULE | Refills: 0 | Status: SHIPPED | OUTPATIENT
Start: 2024-05-30 | End: 2024-06-06

## 2024-05-30 ASSESSMENT — ENCOUNTER SYMPTOMS
CONSTIPATION: 1
FLANK PAIN: 0
DIARRHEA: 0
BLOOD IN STOOL: 0
FEVER: 0
ABDOMINAL PAIN: 0
NAUSEA: 0
VOMITING: 0
CHILLS: 0

## 2024-05-30 NOTE — PROGRESS NOTES
"Subjective     Jonny Spring is a 69 y.o. male who presents with UTI (Hard to pee due to medication (pt unknown what medication) x1 week )            Jonny is a 69 y.o. male who presents to urgent care with pain with urination and urinary hesitancy, urinary urgency/frequency. Patient states symptoms started approximately 1 week ago.  Patient was seen at urgent care in [place] and was prescribed Macrobid which has been taking as prescribed.  Patient has taken 3 days of Macrobid and reports some improvement of symptoms.  Patient reports burning sensation with urination as well as sensation that he needs to urinate frequently and when he does try to urinate only has a couple drops.  Patient also states has been constipated for the last 20 hours and has not had a bowel movement since yesterday.  No abdominal pain.  No nausea/vomiting.  No fever/chills.  Patient concerned that antibiotics are not treating infection as he has had improvement but is still experiencing UTI-like symptoms.  Patient does have follow-up with his primary on Monday.        Review of Systems   Constitutional:  Negative for chills, fever and malaise/fatigue.   Gastrointestinal:  Positive for constipation. Negative for abdominal pain, blood in stool, diarrhea, melena, nausea and vomiting.   Genitourinary:  Positive for dysuria, frequency and urgency. Negative for flank pain and hematuria.   All other systems reviewed and are negative.             Objective     /62 (BP Location: Right arm, Patient Position: Sitting, BP Cuff Size: Adult)   Pulse 75   Temp 36.4 °C (97.5 °F) (Temporal)   Resp 16   Ht 1.753 m (5' 9\")   Wt 90.7 kg (200 lb)   SpO2 99%   BMI 29.53 kg/m²      Physical Exam  Vitals reviewed.   Constitutional:       Appearance: Normal appearance.   HENT:      Head: Normocephalic and atraumatic.      Nose: Nose normal.   Eyes:      Extraocular Movements: Extraocular movements intact.      Conjunctiva/sclera: " Conjunctivae normal.      Pupils: Pupils are equal, round, and reactive to light.   Cardiovascular:      Rate and Rhythm: Normal rate.   Pulmonary:      Effort: Pulmonary effort is normal.   Abdominal:      General: Abdomen is flat. Bowel sounds are normal. There is no distension.      Palpations: Abdomen is soft.      Tenderness: There is no abdominal tenderness. There is no right CVA tenderness, left CVA tenderness, guarding or rebound.   Skin:     General: Skin is warm and dry.   Neurological:      General: No focal deficit present.      Mental Status: He is alert.                             Assessment & Plan          1. Acute cystitis with hematuria  - POCT Urinalysis  - URINE CULTURE(NEW); Future  - Diagnosed with UTI at urgent care in [place].  Patient was started on Macrobid twice daily for 7 days.  Patient has taken 3 days of Macrobid and reports some improvement is still experiencing dysuria, urinary frequency and urinary hesitancy.  - cephALEXin (KEFLEX) 500 MG Cap; Take 1 Capsule by mouth 2 times a day for 7 days.  Dispense: 14 Capsule; Refill: 0    2. Constipation, unspecified constipation type  - Start Miralax 1 scoop with 8 ounces of water daily.  Discussed importance of oral hydration and increase dietary fiber.      Patient has follow up appointment with PCP on Monday, 6/3/2024. Advised to keep follow up as scheduled.    Differential diagnoses, supportive care measures and indications for immediate follow-up discussed with patient. Pathogenesis of diagnosis discussed including typical length and natural progression.      Instructed to return to urgent care or nearest emergency department if symptoms fail to improve, for any change in condition, further concerns, or new concerning symptoms.    Patient states understanding and agrees with the plan of care and discharge instructions.

## 2024-06-04 ENCOUNTER — APPOINTMENT (OUTPATIENT)
Dept: URGENT CARE | Facility: PHYSICIAN GROUP | Age: 69
End: 2024-06-04
Payer: MEDICARE

## 2024-06-04 ENCOUNTER — TELEPHONE (OUTPATIENT)
Dept: URGENT CARE | Facility: PHYSICIAN GROUP | Age: 69
End: 2024-06-04

## 2024-06-04 LAB
BACTERIA UR CULT: ABNORMAL
BACTERIA UR CULT: ABNORMAL
OTHER ANTIBIOTIC SUSC ISLT: ABNORMAL

## 2024-06-17 ENCOUNTER — APPOINTMENT (OUTPATIENT)
Dept: LAB | Facility: MEDICAL CENTER | Age: 69
End: 2024-06-17
Attending: INTERNAL MEDICINE
Payer: MEDICARE